# Patient Record
Sex: FEMALE | Race: BLACK OR AFRICAN AMERICAN | NOT HISPANIC OR LATINO | URBAN - METROPOLITAN AREA
[De-identification: names, ages, dates, MRNs, and addresses within clinical notes are randomized per-mention and may not be internally consistent; named-entity substitution may affect disease eponyms.]

---

## 2019-12-28 ENCOUNTER — EMERGENCY (EMERGENCY)
Facility: HOSPITAL | Age: 64
LOS: 1 days | Discharge: HOME | End: 2019-12-28
Attending: EMERGENCY MEDICINE | Admitting: EMERGENCY MEDICINE
Payer: SELF-PAY

## 2019-12-28 VITALS
SYSTOLIC BLOOD PRESSURE: 165 MMHG | OXYGEN SATURATION: 98 % | HEART RATE: 118 BPM | TEMPERATURE: 101 F | RESPIRATION RATE: 17 BRPM | DIASTOLIC BLOOD PRESSURE: 83 MMHG

## 2019-12-28 VITALS
OXYGEN SATURATION: 99 % | HEART RATE: 114 BPM | SYSTOLIC BLOOD PRESSURE: 174 MMHG | RESPIRATION RATE: 18 BRPM | TEMPERATURE: 99 F | DIASTOLIC BLOOD PRESSURE: 84 MMHG

## 2019-12-28 LAB
FLU A RESULT: NOT DETECTED — SIGNIFICANT CHANGE UP
FLU A RESULT: NOT DETECTED — SIGNIFICANT CHANGE UP
FLUAV AG NPH QL: NOT DETECTED — SIGNIFICANT CHANGE UP
FLUBV AG NPH QL: NOT DETECTED — SIGNIFICANT CHANGE UP
RSV RESULT: SIGNIFICANT CHANGE UP
RSV RNA RESP QL NAA+PROBE: SIGNIFICANT CHANGE UP

## 2019-12-28 PROCEDURE — 71045 X-RAY EXAM CHEST 1 VIEW: CPT | Mod: 26

## 2019-12-28 PROCEDURE — 99283 EMERGENCY DEPT VISIT LOW MDM: CPT

## 2019-12-28 RX ORDER — IBUPROFEN 200 MG
3 TABLET ORAL
Qty: 48 | Refills: 0
Start: 2019-12-28 | End: 2019-12-31

## 2019-12-28 RX ORDER — PSEUDOEPHEDRINE HCL 30 MG
1 TABLET ORAL
Qty: 10 | Refills: 0
Start: 2019-12-28 | End: 2020-01-01

## 2019-12-28 RX ORDER — IBUPROFEN 200 MG
600 TABLET ORAL ONCE
Refills: 0 | Status: COMPLETED | OUTPATIENT
Start: 2019-12-28 | End: 2019-12-28

## 2019-12-28 RX ORDER — ACETAMINOPHEN 500 MG
650 TABLET ORAL ONCE
Refills: 0 | Status: COMPLETED | OUTPATIENT
Start: 2019-12-28 | End: 2019-12-28

## 2019-12-28 RX ORDER — FLUTICASONE PROPIONATE 50 MCG
2 SPRAY, SUSPENSION NASAL
Qty: 1 | Refills: 0
Start: 2019-12-28 | End: 2020-01-03

## 2019-12-28 RX ORDER — PSEUDOEPHEDRINE HCL 30 MG
1 TABLET ORAL
Qty: 14 | Refills: 0
Start: 2019-12-28 | End: 2020-01-03

## 2019-12-28 RX ADMIN — Medication 650 MILLIGRAM(S): at 22:55

## 2019-12-28 RX ADMIN — Medication 600 MILLIGRAM(S): at 22:54

## 2019-12-28 NOTE — ED PROVIDER NOTE - OBJECTIVE STATEMENT
63yo F no significant PMH presents with URI symptoms for 3 days. +fevers for 1 day. Has been having rhinorrhea and nasal congestion for 3 days. Sick contacts at home with grandson +flu. Denies n/v/d. Denies sob / abd pain, 65yo F h/o HTN presents with URI symptoms for 3 days. +fevers for 1 day. Has been having rhinorrhea and nasal congestion for 3 days. Sick contacts at home with grandson +flu. Denies n/v/d. Denies sob / abd pain,

## 2019-12-28 NOTE — ED PROVIDER NOTE - PATIENT PORTAL LINK FT
You can access the FollowMyHealth Patient Portal offered by [facility] by registering at the following website: [email or web address]. By joining [facility]’s FollowMyHealth portal, you will also be able to view your health information using other applications (apps) compatible with our system.

## 2019-12-28 NOTE — ED PROVIDER NOTE - ATTENDING CONTRIBUTION TO CARE
Dr. Linares:  I have personally performed a face to face bedside history and physical examination of this patient. I have discussed the history, examination, review of systems, assessment and plan of management with the resident. I have reviewed the electronic medical record and amended it to reflect my history, review of systems, physical exam, assessment and plan.    64F h/o HTN presents with 3 days of "sinus congestion" and associated cough.  Notes subjective fevers today.  +Sick contact at home.  ROS otherwise negative.    Exam:  - nad  - rrr  - ctab   -abd soft ntnd    A/P  - likely viral URI, r/o PNA  - flu swab, CXR, labs

## 2019-12-28 NOTE — ED PROVIDER NOTE - PROGRESS NOTE DETAILS
Pt discussed flu swab, will take until midnight for results. will send tamiflu to pharmacy, will call for results if positive. Will DC with f/u PMD.

## 2019-12-28 NOTE — ED PROVIDER NOTE - CLINICAL SUMMARY MEDICAL DECISION MAKING FREE TEXT BOX
Pt presents with URI sxs for 3 days with fevers today with Tmax 101. Not hypoxic, not requiring any oxygen. No focal wheezing or crackles. Unlikely PNA, however will obtain CXR. Will get flu swab, CXR, tx with tyl/motrin, reassess. Initially septic, will revital after afebrile.

## 2019-12-28 NOTE — ED PROVIDER NOTE - NS ED ROS FT
ROS: denies HA, weakness, dizziness, nausea/vomiting, chest pain, SOB, diaphoresis, abdominal pain, diarrhea, joint pain, neuro deficits, dysuria/hematuria, rash    +f/c, cough

## 2019-12-28 NOTE — ED ADULT TRIAGE NOTE - CHIEF COMPLAINT QUOTE
pt arrives w/ c/o cold symptoms. pt states she has a cough now and her chest hurt. pt states she also feels pressure in her sinus. pt febrile in triage. PMH HTN. pt gvn mask in triage

## 2019-12-28 NOTE — ED PROVIDER NOTE - NSFOLLOWUPINSTRUCTIONS_ED_ALL_ED_FT
You were seen in the ER for upper respiratory infection.  You had a fever and was given motrin and tylenol.  Flu swab was done.  If flu is positive, take tamiflu twice a day for 5 days.  Take motrin 600mg every 6 hour for fever.  Take tylenol 975mg every 6 hours for fever.  Drink fluids.  Return to ER for life threatening emergencies.  Follow up with your doctor.

## 2019-12-28 NOTE — ED ADULT NURSE NOTE - OBJECTIVE STATEMENT
pt sitting quielty with son at bedside pt st" For 2-3 days I have a cold and sinus congestion and cough constant....everytime I drink water I cough....I just arrived from [facility] on December 8th..." Denies sick contacts. hx of htn. pt st" I did not take my medicine today. Pt st" I have no pain now just when I cough I get a tightness in chest like heaviness but not pain." vs as noted. pt medicated as per orders flu swab sent. as per discussion with MD no labs at this time.

## 2019-12-28 NOTE — ED ADULT NURSE REASSESSMENT NOTE - COMFORT CARE
pt educated to cont drinking fluids, return to ER with any worsening symptoms....instructions given by Resident pt verbalizing understand via teachback method.

## 2020-05-01 NOTE — ED PROVIDER NOTE - PHYSICAL EXAMINATION
Gen: NAD  Head: NCAT  HEENT: PERRL, MMM, normal conjunctiva, anicteric, neck supple  Lung: CTAB, no adventitious sounds  CV: RRR, no murmurs, rubs or gallops  Abd: soft, NTND, no rebound or guarding, no CVAT  MSK: No edema, no visible deformities  Neuro: No focal neurologic deficits. CN II-XII grossly intact. 5/5 strength and normal sensation in all extremities.  Skin: Warm and dry, no evidence of rash  Psych: normal mood and affect Alert and oriented to person, place and time